# Patient Record
Sex: MALE | Race: BLACK OR AFRICAN AMERICAN | NOT HISPANIC OR LATINO | URBAN - METROPOLITAN AREA
[De-identification: names, ages, dates, MRNs, and addresses within clinical notes are randomized per-mention and may not be internally consistent; named-entity substitution may affect disease eponyms.]

---

## 2024-11-04 ENCOUNTER — EMERGENCY (EMERGENCY)
Facility: HOSPITAL | Age: 74
LOS: 1 days | Discharge: HOME | End: 2024-11-04
Admitting: EMERGENCY MEDICINE
Payer: MEDICARE

## 2024-11-04 VITALS
DIASTOLIC BLOOD PRESSURE: 80 MMHG | RESPIRATION RATE: 18 BRPM | HEART RATE: 85 BPM | TEMPERATURE: 98 F | OXYGEN SATURATION: 99 % | SYSTOLIC BLOOD PRESSURE: 144 MMHG | WEIGHT: 145.95 LBS

## 2024-11-04 PROCEDURE — 73610 X-RAY EXAM OF ANKLE: CPT

## 2024-11-04 PROCEDURE — 73630 X-RAY EXAM OF FOOT: CPT | Mod: 26,RT

## 2024-11-04 PROCEDURE — 99284 EMERGENCY DEPT VISIT MOD MDM: CPT

## 2024-11-04 PROCEDURE — 73610 X-RAY EXAM OF ANKLE: CPT | Mod: 26,RT

## 2024-11-04 PROCEDURE — 73630 X-RAY EXAM OF FOOT: CPT

## 2024-11-04 PROCEDURE — 99284 EMERGENCY DEPT VISIT MOD MDM: CPT | Mod: 25

## 2024-11-04 RX ORDER — IBUPROFEN 200 MG
400 TABLET ORAL ONCE
Refills: 0 | Status: COMPLETED | OUTPATIENT
Start: 2024-11-04 | End: 2024-11-04

## 2024-11-04 RX ORDER — IBUPROFEN 200 MG
1 TABLET ORAL
Qty: 20 | Refills: 0
Start: 2024-11-04 | End: 2024-11-08

## 2024-11-04 RX ADMIN — Medication 400 MILLIGRAM(S): at 11:00

## 2024-11-04 NOTE — ED PROVIDER NOTE - PATIENT PORTAL LINK FT
You can access the FollowMyHealth Patient Portal offered by [facility] by registering at the following website: [email or web address]. By joining [employer]’s FollowMyHealth portal, you will also be able to view your health information using other applications (apps) compatible with our system.
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
## 2024-11-04 NOTE — ED PROVIDER NOTE - NSFOLLOWUPINSTRUCTIONS_ED_ALL_ED_FT
wear your boot  and use the cane  you may weight bear on your heel, do not put pressure on your forefoot  follow up with the podiatrist   Take tylenol and or motrin as needed for pain    Foot Fracture in Adults    WHAT YOU NEED TO KNOW:    What do I need to know about a foot fracture? A foot fracture is a break in a bone in your foot.  Foot Anatomy    What are the signs and symptoms of a foot fracture?    Tenderness over the injured area    Foot pain that increases when you try to stand or walk    Numbness in your foot or toes    Cracking sounds when you move your foot    Swelling, bruising, blistering, or open skin breaks    Trouble moving your foot or walking    Foot shape that is not normal  How is a foot fracture diagnosed? Your healthcare provider will examine your foot and check for decreased feeling. He or she will check for any open skin breaks. He or she may check your foot movement. You may need any of the following tests:    An x-ray, CT scan, or MRI may be used to check for a broken bone or other injury. Contrast liquid may be used to help your foot show up better in the pictures. Tell the healthcare provider if you have ever had an allergic reaction to contrast liquid. Do not enter the MRI room with anything metal. Metal can cause serious injury. Tell the healthcare provider if you have any metal in or on your body.    A bone scan may be used to check for a broken bone. You will be given a small amount of radioactive dye in an IV. Pictures will then be taken of your foot.  How is a foot fracture treated? Treatment depends on the kind of fracture you have and how bad it is. You may need any of the following:    A boot, cast, or splint may be put on your foot and lower leg to decrease your foot movement. These work to hold the broken bones in place, decrease pain, and prevent more damage to your foot.    Medicines may be given to prevent or treat pain or a bacterial infection. You may also need a vaccine to prevent tetanus if bone broke through the skin. A tetanus shot is given if you have not had a booster in the past 5 to 10 years.    Surgery may be used to put your bones back into the correct position. Wires, pins, plates, or screws may be used to keep the broken pieces lined up correctly and hold them together.  What can I do to help my foot heal?    Rest your foot and avoid activities that cause pain.    Apply ice to decrease swelling and pain, and to prevent tissue damage. Use an ice pack, or put crushed ice in a plastic bag. Cover it with a towel before you apply it. Use ice for 15 to 20 minutes every hour or as directed.    Elevate your foot above the level of your heart as often as you can. This will help decrease swelling and pain. Prop your foot on pillows or blankets to keep it elevated comfortably.  Elevate Leg      Physical therapy may be needed when your foot has healed. A physical therapist can teach you exercises to help improve movement and strength, and to decrease pain.  Call your local emergency number (911 in the [place]) if:    You suddenly feel lightheaded and short of breath.    You have chest pain when you take a deep breath or cough.    You cough up blood.  When should I seek immediate care?    The pain in your injured foot gets worse even after you rest and take pain medicine.    The skin or toes of your foot become numb, swollen, cold, white, or blue.    You have more pain or swelling than you did before a cast was put on.    Your leg feels warm, tender, and painful. It may look swollen and red.  When should I call my doctor?    You have a fever.    You have new sores around your boot, cast, or splint.    You have new or worsening trouble moving your foot.    You notice a foul smell coming from under your cast.    Your boot, cast, or splint gets damaged.    You have questions or concerns about your condition or care.  CARE AGREEMENT:    You have the right to help plan your care. Learn about your health condition and how it may be treated. Discuss treatment options with your healthcare providers to decide what care you want to receive. You always have the right to refuse treatment.    © [employer] 1973, 2024

## 2024-11-04 NOTE — ED PROVIDER NOTE - CARE PROVIDER_API CALL
Priya York  Podiatric Medicine and Surgery  [address] [place] [address], [address]  [place] [address]  Phone: [phone]  Fax: [phone]  Follow Up Time:

## 2024-11-04 NOTE — CONSULT NOTE ADULT - ASSESSMENT
73 yo m with pmh of prostate ca c/o R foot pain x 1 week. Pt was walking and his leg gave out and he fell, twisting his ankle and landing on his foot 1 week ago. Pt has been limping since then. Xray confirms with R 5th base avulsion fx. Suspicion for R lateral ankle sprain.     Plan:    -Patient evaluated and chart reviewed  -Discussed treatment plan with patient  -Xrays reviewed  -Patient should remain WBAT in CAM boot and Cane  - compression wrap applied  -RICE  -Follow up:     [facility] with Dr. Mulligan within 1-2 weeks of discharge.   Address: [address], 12th floor [facility]  Phone: [phone]  Please call for an appointment.    OR    Patient should follow up with Dr. Priya York within 1 week of discharge.    Office information:          [place] Address- [address], [place] [address] Phone: [phone]         [place] Address- [address] [facility] [address], [place] [address] Phone: [phone]

## 2024-11-04 NOTE — ED ADULT NURSE NOTE - OBJECTIVE STATEMENT
The patient is a 74y Male complaining of foot pain/injury. Pt is A Pt reports R foot pain/swelling x Tuesday s/p fall. + pulses in bl LE, ambulatory with steady gait. Pt denies LOC, head injury during fall. Pt denies CP, SOB, dizziness, F/C, N/V.

## 2024-11-04 NOTE — ED PROVIDER NOTE - PHYSICAL EXAMINATION
CONSTITUTIONAL: Well-appearing;  in no apparent distress.   HEAD: Normocephalic; atraumatic.   EYES: PERRL; EOM intact; conjunctiva and sclera clear  ENT: normal nose; no rhinorrhea; normal pharynx with no erythema or lesions.   NECK: Supple; non-tender;   CARDIOVASCULAR: rrr,  RESPIRATORY: Breathing easily;   MSK: R foot + swelling, +Tenderness over lateral malleolus and 5th metacarpal, DP pulse 2+

## 2024-11-04 NOTE — ED PROVIDER NOTE - CLINICAL SUMMARY MEDICAL DECISION MAKING FREE TEXT BOX
75 yo m with pmh of prostate ca c/o R foot pain x 1 week. Pt was walking and his leg gave out and he fell, twisting his ankle and landing on his foot 1 week ago. Pt has been limping since then. Takes tylenol for pain. Admits to swelling. Denies numbness, tingling.  R foot + swelling, +Tenderness over lateral malleolus and 5th metacarpal, DP pulse 2+ 73 yo m with pmh of prostate ca c/o R foot pain x 1 week. Pt was walking and his leg gave out and he fell, twisting his ankle and landing on his foot 1 week ago. Pt has been limping since then. Takes tylenol for pain. Admits to swelling. Denies numbness, tingling.  R foot + swelling, +Tenderness over lateral malleolus and 5th metacarpal, DP pulse 2+. XR shows 5th metatarsal avulsion fx. seen by podiatry, given CAM walker, can wt bear with heel
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
## 2024-11-07 DIAGNOSIS — Z59.01 SHELTERED HOMELESSNESS: ICD-10-CM

## 2024-11-07 DIAGNOSIS — M79.671 PAIN IN RIGHT FOOT: ICD-10-CM
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
## 2024-11-07 DIAGNOSIS — S92.354A NONDISPLACED FRACTURE OF FIFTH METATARSAL BONE, RIGHT FOOT, INITIAL ENCOUNTER FOR CLOSED FRACTURE: ICD-10-CM
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
## 2024-11-07 DIAGNOSIS — R20.2 PARESTHESIA OF SKIN: ICD-10-CM
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
## 2024-11-07 SDOH — ECONOMIC STABILITY - HOUSING INSECURITY: SHELTERED HOMELESSNESS: Z59.01

## 2024-11-09 ENCOUNTER — EMERGENCY (EMERGENCY)
Facility: HOSPITAL | Age: 74
LOS: 1 days | Discharge: HOME | End: 2024-11-09
Admitting: STUDENT IN AN ORGANIZED HEALTH CARE EDUCATION/TRAINING PROGRAM
Payer: MEDICARE

## 2024-11-09 VITALS
DIASTOLIC BLOOD PRESSURE: 74 MMHG | HEART RATE: 62 BPM | TEMPERATURE: 98 F | OXYGEN SATURATION: 99 % | RESPIRATION RATE: 15 BRPM | SYSTOLIC BLOOD PRESSURE: 136 MMHG

## 2024-11-09 VITALS
TEMPERATURE: 98 F | OXYGEN SATURATION: 98 % | DIASTOLIC BLOOD PRESSURE: 84 MMHG | SYSTOLIC BLOOD PRESSURE: 147 MMHG | RESPIRATION RATE: 18 BRPM | HEART RATE: 70 BPM | HEIGHT: 73 IN | WEIGHT: 145.06 LBS

## 2024-11-09 PROCEDURE — 99284 EMERGENCY DEPT VISIT MOD MDM: CPT

## 2024-11-09 PROCEDURE — 99282 EMERGENCY DEPT VISIT SF MDM: CPT

## 2024-11-09 NOTE — ED ADULT NURSE NOTE - OBJECTIVE STATEMENT
Pt alert and orientedx4, c/o R foot pain, ambulating into ed with steady gait with personal cane. Boot to right foot in place. Per pt statement he has torn achilles tendon and is wearing a boot. Pt reports the boot  is too heavy and is making the pain worse. Capillary refill to right foot less than 2 seconds. Denies numbness or tingling to right foot. Patient able to moves toes in right foot.

## 2024-11-09 NOTE — ED PROVIDER NOTE - MUSCULOSKELETAL, MLM
Spine appears normal, range of motion is not limited, no muscle or joint tenderness; R LE w/cam boot in place - pt refusing to be examined

## 2024-11-09 NOTE — ED ADULT NURSE NOTE - CAS TRG GENERAL NORM CIRC DET
Strong peripheral pulses/Capillary refill less/equal to 2 seconds/No visible significant external bleeding/Obvious significant bleeding

## 2024-11-09 NOTE — ED ADULT TRIAGE NOTE - CHIEF COMPLAINT QUOTE
Pt c/o R foot pain. Pt has a torn achilles tendon and is wearing a boot. Pt reports the boot given to him is too heavy and is making the pain worse. Pt requesting for reevaluation of the foot and new boot.

## 2024-11-09 NOTE — ED ADULT TRIAGE NOTE - TEMPERATURE IN CELSIUS (DEGREES C)
----- Message from Kyara Hawk sent at 8/21/2020 10:16 AM CDT -----  Regarding: missed call  Type:  Patient Returning Call    Who Called:pt  Who Left Message for Patient:staff  Does the patient know what this is regarding?:n/a  Would the patient rather a call back or a response via [facility]? Call back  Best Call Back Number:[phone]  Additional Information: please call back . Thanks       36.6

## 2024-11-09 NOTE — ED ADULT NURSE REASSESSMENT NOTE - NS ED NURSE REASSESS COMMENT FT1
Zee GONSALVES addressed patient concerns, re-evaluated patient's right foot/ankle. Patient was educated by Zee GONSALVES on the importance of following up with podiatry as scheduled on Novemeber 15. Patient verbalized understanding. Patient offered a new cam boot, pt declined. Patient has no swelling to right ankle currently, no skin discoloration, denies numbness or tingling. Capillary refill is less than 2 seconds. Positive pulses palpated to right foot. No open wounds noted to right foot. Zee GONSALVES offered to re-wrap right foot, pt declined.  Patient applied cam boot correctly, pt correctly taught back foot care ice 20 min on and off don't apply ice directly to skin 3 to 4 times a day, keep right foot elevated on pillows when resting at home. Pt further educated to rest right foot and not walk on it for long periods. Patient offered crutches and pt declined. Patient states "I prefer to use my cane." Patient educated when sitting in chair keep foot elevated on second chair with pillows. Patient verbalized understanding. Unit secretary is setting up discharge transportation / taxi for patient. Patient's address was incorrect, correct address provided to unit secretary.

## 2024-11-09 NOTE — ED ADULT NURSE REASSESSMENT NOTE - NS ED NURSE REASSESS COMMENT FT1
Podiatrist was called a second time per pt request. Patient asking for alternative to cam boot. Patient educated per podiatrist recommendation he spoke with his attending and due to his current injury he has to remain with cam boot. Patient educated on the importance of elevated his foot when resting at home with cam boot off and icing right foot 20 min on and off. Patient was re-educated by ed provider about his prior diagnosis and evaluated his right ankle and reassured pt that his current treatment per podiatrist is the right treatment at this time. Podiatrist spoke with pt at bedside and educated patient that he can purchase in a surgical supply or online different cam boot if he does not like the cam boot he currently has. Patient was assisted in making an appointment with a podiatrist since per pt he did not show up to his last appointment.  spoke with patient who offered pt transportation, which pt declined. Patient verbalized he understands the importance of needing to follow up with the podiatrist for ongoing monitoring and treatment of his foot.

## 2024-11-09 NOTE — ED PROVIDER NOTE - CLINICAL SUMMARY MEDICAL DECISION MAKING FREE TEXT BOX
pt returns to ED requesting a new boot - feels that the cam boot that was applied on 11/4 for his metatarsal fx is "wrong", pt uncooperative and demanding to only see/speak to podiatry - consulted and plan remains unchanged, pt then requesting to speak to sw -- was seen. pt offered crutches to assist w/cam boot but refused. explained that cam boot is the appropriate tx for the metatarsal injury

## 2024-11-09 NOTE — ED PROVIDER NOTE - OBJECTIVE STATEMENT
The pt is a 75 y/o M, who presents to ED stating "I want to see the orthopedic supervisor cause you all put the wrong boot on me" - pt was dx'd w/metatarsal fx on 11/4, very upset w/cam boot that was given - states "it's too heavy for me, I want a proper boot".  Has not f/u w/podiatrist nor made an appointment to see them. Denies new injury, fall, numbness or tingling to toes.

## 2024-11-09 NOTE — ED PROVIDER NOTE - NSFOLLOWUPINSTRUCTIONS_ED_ALL_ED_FT
rest, ice, elevate, use the cam boot and follow up with podiatry  as instructed on prior visit   A person wrapping a bandage around an ankle.  Elastic bandages come in different shapes and sizes. They provide support to your injury and reduce swelling while you're healing. Your health care provider will help you decide what is best for your protection, recovery, or rehabilitation after an injury.  The routine care of many injuries includes rest, ice, compression, and elevation. This is called RICE therapy.  RICE therapy:  Is often recommended for muscle strain, sprains, bruises, and overuse injuries.  Can also be used for some bone injuries.  Can help to relieve pain and lessen swelling.  General tips  Use the bandage as directed by the maker of the bandage that you're using. Follow instructions on the package that the bandage came in.  Do not wrap the bandage too tightly. This may block, or cut off, the circulation in the arm or leg in the area below the bandage.  If part of your body beyond the bandage becomes blue, numb, cold, swollen, or more painful, your bandage is probably too tight. If this happens, take off your bandage and re-apply it more loosely.  Take off and re-apply an elastic bandage every 3–4 hours or as told by your provider.  See your provider if the bandage makes your problems worse instead of better.  Supplies needed:  Ice.  Plastic bag.  Towel.  Elastic bandage.  Pillow or pillows to raise (elevate) the injured body part.  How to care for your injury with RICE therapy  Rest  Rest your injured area. This may help with healing. Rest often involves limiting your normal activities and not using the injured part of your body. Usually, you can return to your normal activities when your provider says it's OK and when you can do them without much discomfort.  If you rest the injury too much, it may not heal as well. Some injuries heal better with early movement instead of resting for too long. Talk with your provider about how long you should limit your activities and whether you should start range-of-motion exercises for your injury.  Ice  Bag of ice on a towel on the skin.  Use ice or an ice pack as told.  Place a towel between your skin and the ice.  Leave the ice on for 20 minutes, 2–3 times a day.  If your skin turns red, take off the ice right away to prevent skin damage. The risk of damage is higher if you can't feel pain, heat, or cold.  Use ice on as many days as told by your provider.  Compression  Putting pressure (compression) on your injured area with an elastic bandage is part of RICE therapy. Compression can help control swelling, give support, and help with discomfort.  Elevation  Raise (elevate) the injured area above the level of your heart while you're sitting or lying down. Use pillows as needed.  Follow these instructions at home:  If your symptoms get worse or continue, make a follow-up appointment with your provider. You may need more evaluation or imaging tests, such as X-rays or an MRI.  If you have imaging tests, ask when your results will be ready and how to get them. You may need to call or meet with your provider to get your results.  Ask what things are safe for you to do at home. Ask when you can go back to work or school.  Contact a health care provider if:  Your pain and swelling continue.  Your symptoms are getting worse rather than improving.  Get help right away if:  You have sudden, very bad pain at or below the area of your injury.  You have redness or increased swelling around your injury.  You have tingling or numbness at or below the area of your injury and it doesn't improve after you take off the elastic bandage.

## 2024-11-09 NOTE — ED PROVIDER NOTE - PATIENT PORTAL LINK FT
You can access the FollowMyHealth Patient Portal offered by [facility] by registering at the following website: [email or web address]. By joining [employer]’s FollowMyHealth portal, you will also be able to view your health information using other applications (apps) compatible with our system.

## 2024-11-12 DIAGNOSIS — Z51.89 ENCOUNTER FOR OTHER SPECIFIED AFTERCARE: ICD-10-CM
